# Patient Record
Sex: FEMALE | Race: BLACK OR AFRICAN AMERICAN | NOT HISPANIC OR LATINO | Employment: FULL TIME | URBAN - METROPOLITAN AREA
[De-identification: names, ages, dates, MRNs, and addresses within clinical notes are randomized per-mention and may not be internally consistent; named-entity substitution may affect disease eponyms.]

---

## 2021-03-29 ENCOUNTER — HOSPITAL ENCOUNTER (OUTPATIENT)
Dept: GENERAL RADIOLOGY | Age: 36
Discharge: HOME | End: 2021-03-29
Attending: EMERGENCY MEDICINE

## 2021-03-29 ENCOUNTER — TELEPHONE (OUTPATIENT)
Dept: SCHEDULING | Age: 36
End: 2021-03-29

## 2021-03-29 ENCOUNTER — WALK IN (OUTPATIENT)
Dept: URGENT CARE | Age: 36
End: 2021-03-29
Attending: EMERGENCY MEDICINE

## 2021-03-29 DIAGNOSIS — M54.12 CERVICAL RADICULOPATHY: ICD-10-CM

## 2021-03-29 DIAGNOSIS — M54.12 CERVICAL RADICULOPATHY: Primary | ICD-10-CM

## 2021-03-29 PROCEDURE — 72050 X-RAY EXAM NECK SPINE 4/5VWS: CPT

## 2021-03-29 PROCEDURE — 73030 X-RAY EXAM OF SHOULDER: CPT

## 2021-03-29 PROCEDURE — 99202 OFFICE O/P NEW SF 15 MIN: CPT

## 2021-03-29 RX ORDER — CYCLOBENZAPRINE HCL 5 MG
5 TABLET ORAL 3 TIMES DAILY PRN
Qty: 21 TABLET | Refills: 0 | Status: SHIPPED | OUTPATIENT
Start: 2021-03-29 | End: 2021-04-05

## 2021-03-29 RX ORDER — HYDROCODONE BITARTRATE AND ACETAMINOPHEN 5; 325 MG/1; MG/1
2 TABLET ORAL EVERY 6 HOURS PRN
Qty: 18 TABLET | Refills: 0 | Status: SHIPPED | OUTPATIENT
Start: 2021-03-29 | End: 2021-04-01

## 2021-03-29 RX ORDER — HYDROCODONE BITARTRATE AND ACETAMINOPHEN 5; 325 MG/1; MG/1
2 TABLET ORAL EVERY 6 HOURS PRN
Qty: 18 TABLET | Refills: 0 | Status: SHIPPED | OUTPATIENT
Start: 2021-03-29 | End: 2021-03-29 | Stop reason: SDUPTHER

## 2021-03-29 RX ORDER — METHYLPREDNISOLONE 4 MG/1
4 TABLET ORAL SEE ADMIN INSTRUCTIONS
Qty: 21 TABLET | Refills: 0 | Status: SHIPPED | OUTPATIENT
Start: 2021-03-29

## 2021-03-29 ASSESSMENT — PAIN SCALES - GENERAL: PAINLEVEL: 9-10

## 2021-05-26 VITALS
HEART RATE: 98 BPM | RESPIRATION RATE: 16 BRPM | DIASTOLIC BLOOD PRESSURE: 79 MMHG | SYSTOLIC BLOOD PRESSURE: 141 MMHG | OXYGEN SATURATION: 99 % | WEIGHT: 230 LBS | TEMPERATURE: 97.6 F

## 2023-04-03 ENCOUNTER — LAB ENCOUNTER (OUTPATIENT)
Dept: LAB | Age: 38
End: 2023-04-03
Attending: INTERNAL MEDICINE
Payer: COMMERCIAL

## 2023-04-03 ENCOUNTER — OFFICE VISIT (OUTPATIENT)
Dept: INTERNAL MEDICINE CLINIC | Facility: CLINIC | Age: 38
End: 2023-04-03
Payer: COMMERCIAL

## 2023-04-03 VITALS
SYSTOLIC BLOOD PRESSURE: 148 MMHG | TEMPERATURE: 100 F | OXYGEN SATURATION: 99 % | DIASTOLIC BLOOD PRESSURE: 80 MMHG | HEIGHT: 64.5 IN | WEIGHT: 231.63 LBS | HEART RATE: 90 BPM | RESPIRATION RATE: 16 BRPM | BODY MASS INDEX: 39.07 KG/M2

## 2023-04-03 DIAGNOSIS — I10 PRIMARY HYPERTENSION: ICD-10-CM

## 2023-04-03 DIAGNOSIS — F41.9 ANXIETY: ICD-10-CM

## 2023-04-03 DIAGNOSIS — Z00.00 ROUTINE PHYSICAL EXAMINATION: ICD-10-CM

## 2023-04-03 DIAGNOSIS — Z00.00 ROUTINE PHYSICAL EXAMINATION: Primary | ICD-10-CM

## 2023-04-03 LAB
ALT SERPL-CCNC: 24 U/L
ANION GAP SERPL CALC-SCNC: 2 MMOL/L (ref 0–18)
AST SERPL-CCNC: 14 U/L (ref 15–37)
BASOPHILS # BLD AUTO: 0.06 X10(3) UL (ref 0–0.2)
BASOPHILS NFR BLD AUTO: 0.5 %
BUN BLD-MCNC: 17 MG/DL (ref 7–18)
CALCIUM BLD-MCNC: 9.5 MG/DL (ref 8.5–10.1)
CHLORIDE SERPL-SCNC: 107 MMOL/L (ref 98–112)
CHOLEST SERPL-MCNC: 180 MG/DL (ref ?–200)
CO2 SERPL-SCNC: 27 MMOL/L (ref 21–32)
CREAT BLD-MCNC: 0.87 MG/DL
EOSINOPHIL # BLD AUTO: 0.14 X10(3) UL (ref 0–0.7)
EOSINOPHIL NFR BLD AUTO: 1.2 %
ERYTHROCYTE [DISTWIDTH] IN BLOOD BY AUTOMATED COUNT: 14.9 %
FASTING PATIENT LIPID ANSWER: NO
FASTING STATUS PATIENT QL REPORTED: NO
GFR SERPLBLD BASED ON 1.73 SQ M-ARVRAT: 88 ML/MIN/1.73M2 (ref 60–?)
GLUCOSE BLD-MCNC: 104 MG/DL (ref 70–99)
HCT VFR BLD AUTO: 40.1 %
HDLC SERPL-MCNC: 52 MG/DL (ref 40–59)
HGB BLD-MCNC: 12.9 G/DL
IMM GRANULOCYTES # BLD AUTO: 0.02 X10(3) UL (ref 0–1)
IMM GRANULOCYTES NFR BLD: 0.2 %
LDLC SERPL CALC-MCNC: 109 MG/DL (ref ?–100)
LYMPHOCYTES # BLD AUTO: 4.28 X10(3) UL (ref 1–4)
LYMPHOCYTES NFR BLD AUTO: 37.2 %
MCH RBC QN AUTO: 25.2 PG (ref 26–34)
MCHC RBC AUTO-ENTMCNC: 32.2 G/DL (ref 31–37)
MCV RBC AUTO: 78.3 FL
MONOCYTES # BLD AUTO: 1.07 X10(3) UL (ref 0.1–1)
MONOCYTES NFR BLD AUTO: 9.3 %
NEUTROPHILS # BLD AUTO: 5.93 X10 (3) UL (ref 1.5–7.7)
NEUTROPHILS # BLD AUTO: 5.93 X10(3) UL (ref 1.5–7.7)
NEUTROPHILS NFR BLD AUTO: 51.6 %
NONHDLC SERPL-MCNC: 128 MG/DL (ref ?–130)
OSMOLALITY SERPL CALC.SUM OF ELEC: 284 MOSM/KG (ref 275–295)
PLATELET # BLD AUTO: 386 10(3)UL (ref 150–450)
POTASSIUM SERPL-SCNC: 4 MMOL/L (ref 3.5–5.1)
RBC # BLD AUTO: 5.12 X10(6)UL
SODIUM SERPL-SCNC: 136 MMOL/L (ref 136–145)
TRIGL SERPL-MCNC: 106 MG/DL (ref 30–149)
TSI SER-ACNC: 2.5 MIU/ML (ref 0.36–3.74)
VLDLC SERPL CALC-MCNC: 18 MG/DL (ref 0–30)
WBC # BLD AUTO: 11.5 X10(3) UL (ref 4–11)

## 2023-04-03 PROCEDURE — 80061 LIPID PANEL: CPT

## 2023-04-03 PROCEDURE — 84443 ASSAY THYROID STIM HORMONE: CPT

## 2023-04-03 PROCEDURE — 80048 BASIC METABOLIC PNL TOTAL CA: CPT

## 2023-04-03 PROCEDURE — 84460 ALANINE AMINO (ALT) (SGPT): CPT

## 2023-04-03 PROCEDURE — 36415 COLL VENOUS BLD VENIPUNCTURE: CPT

## 2023-04-03 PROCEDURE — 84450 TRANSFERASE (AST) (SGOT): CPT

## 2023-04-03 PROCEDURE — 85025 COMPLETE CBC W/AUTO DIFF WBC: CPT

## 2023-04-03 RX ORDER — RISPERIDONE 1 MG/1
1 TABLET ORAL NIGHTLY
COMMUNITY
Start: 2023-02-06

## 2023-04-03 RX ORDER — METRONIDAZOLE 500 MG/1
500 TABLET ORAL 2 TIMES DAILY
COMMUNITY
Start: 2023-03-27

## 2023-04-03 RX ORDER — GABAPENTIN 100 MG/1
100 CAPSULE ORAL 2 TIMES DAILY PRN
COMMUNITY
Start: 2023-02-06

## 2023-04-03 RX ORDER — AMLODIPINE BESYLATE 5 MG/1
5 TABLET ORAL DAILY
Qty: 90 TABLET | Refills: 0 | Status: SHIPPED | OUTPATIENT
Start: 2023-04-03 | End: 2024-03-28

## 2023-04-03 NOTE — PATIENT INSTRUCTIONS
Continue to exercise at least 150 minutes a week and Eat a plant based diet     Please take 2000 IU of vitamin D daily for life to keep your bones strong    I have ordered blood tests for you.  No need to fast    Please start amlodipine at night time for your blood pressure    I have referred you to our therapist, she will give you a call    You received the Tdap vaccine (tetanus vaccine) today    See me back in 1 month for a blood pressure check

## 2023-04-04 NOTE — PROGRESS NOTES
Dear RN, please let the patient know   1. Bad cholesterol called LDL is slightly high. Continue to exercise at least 150 minutes a week and eat a high fiber diet  2. Liver and kidney function is normal  3. White blood cell count is very mildly elevated. We will continue to monitor. This could be her normal or could be due to a recent infection  4.  Thyroid function is normal  Please let me know if you have any questions  Alyssa Morales DO

## 2023-04-05 ENCOUNTER — TELEPHONE (OUTPATIENT)
Dept: INTERNAL MEDICINE CLINIC | Facility: CLINIC | Age: 38
End: 2023-04-05

## 2023-05-08 ENCOUNTER — OFFICE VISIT (OUTPATIENT)
Dept: INTERNAL MEDICINE CLINIC | Facility: CLINIC | Age: 38
End: 2023-05-08
Payer: COMMERCIAL

## 2023-05-08 VITALS
BODY MASS INDEX: 40.58 KG/M2 | OXYGEN SATURATION: 98 % | TEMPERATURE: 98 F | HEIGHT: 64.5 IN | SYSTOLIC BLOOD PRESSURE: 130 MMHG | RESPIRATION RATE: 18 BRPM | DIASTOLIC BLOOD PRESSURE: 82 MMHG | HEART RATE: 111 BPM | WEIGHT: 240.63 LBS

## 2023-05-08 DIAGNOSIS — F41.1 GAD (GENERALIZED ANXIETY DISORDER): ICD-10-CM

## 2023-05-08 DIAGNOSIS — I10 PRIMARY HYPERTENSION: Primary | ICD-10-CM

## 2023-05-08 PROBLEM — F41.9 ANXIETY: Status: RESOLVED | Noted: 2023-04-03 | Resolved: 2023-05-08

## 2023-05-08 PROBLEM — F34.81 DISRUPTIVE MOOD DYSREGULATION DISORDER (HCC): Status: ACTIVE | Noted: 2023-05-08

## 2023-05-08 PROCEDURE — 3075F SYST BP GE 130 - 139MM HG: CPT | Performed by: INTERNAL MEDICINE

## 2023-05-08 PROCEDURE — 3079F DIAST BP 80-89 MM HG: CPT | Performed by: INTERNAL MEDICINE

## 2023-05-08 PROCEDURE — 3008F BODY MASS INDEX DOCD: CPT | Performed by: INTERNAL MEDICINE

## 2023-05-08 PROCEDURE — 99213 OFFICE O/P EST LOW 20 MIN: CPT | Performed by: INTERNAL MEDICINE

## 2023-05-08 RX ORDER — AMLODIPINE BESYLATE 5 MG/1
5 TABLET ORAL DAILY
Qty: 90 TABLET | Refills: 1 | Status: SHIPPED | OUTPATIENT
Start: 2023-05-08 | End: 2024-05-02

## 2023-05-08 RX ORDER — ALPRAZOLAM 0.5 MG/1
0.5 TABLET ORAL DAILY PRN
COMMUNITY
Start: 2023-04-29

## 2023-05-08 RX ORDER — RISPERIDONE 2 MG/1
2 TABLET ORAL NIGHTLY
COMMUNITY
Start: 2023-04-29

## 2024-04-09 ENCOUNTER — TELEPHONE (OUTPATIENT)
Dept: INTERNAL MEDICINE CLINIC | Facility: CLINIC | Age: 39
End: 2024-04-09

## 2024-04-09 DIAGNOSIS — I10 PRIMARY HYPERTENSION: ICD-10-CM

## 2024-04-09 NOTE — TELEPHONE ENCOUNTER
Patient called the office to report that she had a high blood pressure at work today during a physical with her employer. She states that at 2:00 today that her bp was reading at 149/90. Patient also states that she was advised by her employer to make an appointment to get some medication to help with this, but next available is on 4/30. Please call back at [phone] and advise.   Future Appointments   Date Time Provider Department Center   4/30/2024 12:30 PM Alyssa Solis,  [facility] [place]

## 2024-04-09 NOTE — TELEPHONE ENCOUNTER
S/w patient. C/o having elevated BP today at work. Reports it was 149/90. Patient checked BP again today at 4:30 pm and reports result of 149/90 again. She is using automated upper arm cuff. Did not check HR. She stopped taking Amlodipine when she ran out in December. She has been noticing daily headaches localized to frontal area, reports pain is 8/10 and will occasionally take OTC Tylenol but other wise \"mostly deals with it\". She noticed they started in February. Also about 2 weeks ago has been having intermittent episodes of noticing \"zig zags\" in visual field that last a few minutes. Denies CP, lightheadedness/dizziness, SOB, N/V, numbness/weakness, palpitations, swelling.     She wears a respirator at work and they are worried about her BP.     Patient has follow up appt on 4/30, please advise would you like patient to be seen sooner or restart BP medication in mean time?     Explained to patient to check BP tonight, tomorrow and maintain log (explained proper technique).     Patient aware should proceed to ED with increasing BP >180/120, worsening/new symptoms.

## 2024-04-10 RX ORDER — AMLODIPINE BESYLATE 5 MG/1
5 TABLET ORAL DAILY
Qty: 30 TABLET | Refills: 0 | Status: SHIPPED | OUTPATIENT
Start: 2024-04-10 | End: 2025-04-05

## 2024-04-10 NOTE — TELEPHONE ENCOUNTER
I have refilled the medication for one month. Add her to the cancellation list  Alyssa Solis DO

## 2024-04-30 ENCOUNTER — OFFICE VISIT (OUTPATIENT)
Dept: INTERNAL MEDICINE CLINIC | Facility: CLINIC | Age: 39
End: 2024-04-30
Payer: COMMERCIAL

## 2024-04-30 ENCOUNTER — LAB ENCOUNTER (OUTPATIENT)
Dept: LAB | Age: 39
End: 2024-04-30
Attending: INTERNAL MEDICINE
Payer: COMMERCIAL

## 2024-04-30 VITALS
SYSTOLIC BLOOD PRESSURE: 142 MMHG | TEMPERATURE: 98 F | OXYGEN SATURATION: 99 % | DIASTOLIC BLOOD PRESSURE: 82 MMHG | HEIGHT: 64.5 IN | WEIGHT: 234.63 LBS | BODY MASS INDEX: 39.57 KG/M2 | RESPIRATION RATE: 16 BRPM | HEART RATE: 85 BPM

## 2024-04-30 DIAGNOSIS — I10 PRIMARY HYPERTENSION: ICD-10-CM

## 2024-04-30 DIAGNOSIS — E66.01 CLASS 2 SEVERE OBESITY WITH SERIOUS COMORBIDITY AND BODY MASS INDEX (BMI) OF 39.0 TO 39.9 IN ADULT, UNSPECIFIED OBESITY TYPE (HCC): ICD-10-CM

## 2024-04-30 DIAGNOSIS — Z00.00 ROUTINE PHYSICAL EXAMINATION: Primary | ICD-10-CM

## 2024-04-30 DIAGNOSIS — R07.89 ATYPICAL CHEST PAIN: ICD-10-CM

## 2024-04-30 DIAGNOSIS — F41.1 GAD (GENERALIZED ANXIETY DISORDER): ICD-10-CM

## 2024-04-30 DIAGNOSIS — H53.8 BLURRY VISION: ICD-10-CM

## 2024-04-30 DIAGNOSIS — F34.81 DISRUPTIVE MOOD DYSREGULATION DISORDER (HCC): ICD-10-CM

## 2024-04-30 DIAGNOSIS — Z00.00 ROUTINE PHYSICAL EXAMINATION: ICD-10-CM

## 2024-04-30 PROBLEM — E66.812 CLASS 2 SEVERE OBESITY WITH SERIOUS COMORBIDITY AND BODY MASS INDEX (BMI) OF 39.0 TO 39.9 IN ADULT (HCC): Status: ACTIVE | Noted: 2024-04-30

## 2024-04-30 LAB
ALT SERPL-CCNC: 16 U/L
ANION GAP SERPL CALC-SCNC: 8 MMOL/L (ref 0–18)
AST SERPL-CCNC: 21 U/L (ref ?–34)
ATRIAL RATE: 86 BPM
BASOPHILS # BLD AUTO: 0.04 X10(3) UL (ref 0–0.2)
BASOPHILS NFR BLD AUTO: 0.4 %
BUN BLD-MCNC: 11 MG/DL (ref 9–23)
BUN/CREAT SERPL: 12 (ref 10–20)
CALCIUM BLD-MCNC: 9.6 MG/DL (ref 8.7–10.4)
CHLORIDE SERPL-SCNC: 106 MMOL/L (ref 98–112)
CHOLEST SERPL-MCNC: 207 MG/DL (ref ?–200)
CO2 SERPL-SCNC: 25 MMOL/L (ref 21–32)
CREAT BLD-MCNC: 0.92 MG/DL
DEPRECATED RDW RBC AUTO: 43.6 FL (ref 35.1–46.3)
EGFRCR SERPLBLD CKD-EPI 2021: 82 ML/MIN/1.73M2 (ref 60–?)
EOSINOPHIL # BLD AUTO: 0.11 X10(3) UL (ref 0–0.7)
EOSINOPHIL NFR BLD AUTO: 1.2 %
ERYTHROCYTE [DISTWIDTH] IN BLOOD BY AUTOMATED COUNT: 15.3 % (ref 11–15)
EST. AVERAGE GLUCOSE BLD GHB EST-MCNC: 114 MG/DL (ref 68–126)
FASTING PATIENT LIPID ANSWER: NO
FASTING STATUS PATIENT QL REPORTED: NO
GLUCOSE BLD-MCNC: 90 MG/DL (ref 70–99)
HBA1C MFR BLD: 5.6 % (ref ?–5.7)
HCT VFR BLD AUTO: 39.5 %
HDLC SERPL-MCNC: 49 MG/DL (ref 40–59)
HGB BLD-MCNC: 12.4 G/DL
IMM GRANULOCYTES # BLD AUTO: 0.02 X10(3) UL (ref 0–1)
IMM GRANULOCYTES NFR BLD: 0.2 %
LDLC SERPL CALC-MCNC: 141 MG/DL (ref ?–100)
LYMPHOCYTES # BLD AUTO: 3.82 X10(3) UL (ref 1–4)
LYMPHOCYTES NFR BLD AUTO: 40.1 %
MCH RBC QN AUTO: 24.7 PG (ref 26–34)
MCHC RBC AUTO-ENTMCNC: 31.4 G/DL (ref 31–37)
MCV RBC AUTO: 78.5 FL
MONOCYTES # BLD AUTO: 0.75 X10(3) UL (ref 0.1–1)
MONOCYTES NFR BLD AUTO: 7.9 %
NEUTROPHILS # BLD AUTO: 4.78 X10 (3) UL (ref 1.5–7.7)
NEUTROPHILS # BLD AUTO: 4.78 X10(3) UL (ref 1.5–7.7)
NEUTROPHILS NFR BLD AUTO: 50.2 %
NONHDLC SERPL-MCNC: 158 MG/DL (ref ?–130)
OSMOLALITY SERPL CALC.SUM OF ELEC: 287 MOSM/KG (ref 275–295)
P AXIS: 57 DEGREES
P-R INTERVAL: 158 MS
PLATELET # BLD AUTO: 365 10(3)UL (ref 150–450)
POTASSIUM SERPL-SCNC: 4.2 MMOL/L (ref 3.5–5.1)
Q-T INTERVAL: 368 MS
QRS DURATION: 86 MS
QTC CALCULATION (BEZET): 440 MS
R AXIS: 30 DEGREES
RBC # BLD AUTO: 5.03 X10(6)UL
SODIUM SERPL-SCNC: 139 MMOL/L (ref 136–145)
T AXIS: 8 DEGREES
TRIGL SERPL-MCNC: 92 MG/DL (ref 30–149)
TSI SER-ACNC: 1.48 MIU/ML (ref 0.55–4.78)
VENTRICULAR RATE: 86 BPM
VLDLC SERPL CALC-MCNC: 17 MG/DL (ref 0–30)
WBC # BLD AUTO: 9.5 X10(3) UL (ref 4–11)

## 2024-04-30 PROCEDURE — 80061 LIPID PANEL: CPT

## 2024-04-30 PROCEDURE — 84443 ASSAY THYROID STIM HORMONE: CPT

## 2024-04-30 PROCEDURE — 80048 BASIC METABOLIC PNL TOTAL CA: CPT

## 2024-04-30 PROCEDURE — 36415 COLL VENOUS BLD VENIPUNCTURE: CPT

## 2024-04-30 PROCEDURE — 84460 ALANINE AMINO (ALT) (SGPT): CPT

## 2024-04-30 PROCEDURE — 84450 TRANSFERASE (AST) (SGOT): CPT

## 2024-04-30 PROCEDURE — 85025 COMPLETE CBC W/AUTO DIFF WBC: CPT

## 2024-04-30 PROCEDURE — 83036 HEMOGLOBIN GLYCOSYLATED A1C: CPT

## 2024-04-30 PROCEDURE — 99213 OFFICE O/P EST LOW 20 MIN: CPT | Performed by: INTERNAL MEDICINE

## 2024-04-30 PROCEDURE — 93000 ELECTROCARDIOGRAM COMPLETE: CPT | Performed by: INTERNAL MEDICINE

## 2024-04-30 PROCEDURE — 99395 PREV VISIT EST AGE 18-39: CPT | Performed by: INTERNAL MEDICINE

## 2024-04-30 RX ORDER — AMLODIPINE BESYLATE 10 MG/1
10 TABLET ORAL DAILY
Qty: 90 TABLET | Refills: 0 | Status: SHIPPED | OUTPATIENT
Start: 2024-04-30

## 2024-04-30 NOTE — PROGRESS NOTES
Patient Office Visit    ASSESSMENT AND PLAN:   1. Routine physical examination  Note: Please take 2000 IU of vitamin D daily for life to keep your bones strong. Please see your dentist every 6 months.  Continue with regular eye exams  - ALT(SGPT); Future  - AST (SGOT); Future  - Basic Metabolic Panel (8); Future  - Lipid Panel; Future  - CBC With Differential With Platelet; Future  - TSH W Reflex To Free T4; Future  - Hemoglobin A1C; Future    2. Atypical chest pain  Note: EKG with normal sinus rhythm and no new ST or T wave changes.  Patient denies any acid reflux-like symptoms.  She denies any symptoms currently.  Recommended patient to get an echocardiogram and if symptoms worsen in any way or returns then she should go to the ER immediately.  - EKG with interpretation and Report -IN OFFICE [58922]  - CARD ECHO 2D DOPPLER (CPT=93306); Future    3. Primary hypertension  Note: Will increase amlodipine to 10 mg.   - amLODIPine 10 MG Oral Tab; Take 1 tablet (10 mg total) by mouth daily.  Dispense: 90 tablet; Refill: 0    4. Class 2 severe obesity with serious comorbidity and body mass index (BMI) of 39.0 to 39.9 in adult, unspecified obesity type (HCC)  Note: Continue to work on diet and lifestyle modifications    5. Disruptive mood dysregulation disorder (HCC)  Note: Follows with psychiatry and will continue with sertraline    6. PIPER (generalized anxiety disorder)  Note: As above    7. Blurry vision  Note: Referral provided  - Ophthalmology Referral - External    Discussed to go to the ER if her symptoms worsen in any way otherwise she will follow-up in 3 to 4 weeks for blood pressure check      Patient/Caregiver Education: Patient/Caregiver Education: There are no barriers to learning. Medical education done. Outcome: Patient verbalizes understanding. Patient is notified to call with any questions, complications, allergies, or worsening or changing symptoms.  Patient is to call with any side effects or  complications from the treatments as a result of today.      Reviewed Past Medical History and   Patient Active Problem List   Diagnosis    PIPER (generalized anxiety disorder)    Disruptive mood dysregulation disorder (HCC)    Class 2 severe obesity with serious comorbidity and body mass index (BMI) of 39.0 to 39.9 in adult (HCC)    Primary hypertension       Orders Placed This Encounter   Procedures    ALT(SGPT)     Standing Status:   Future     Standing Expiration Date:   4/30/2025    AST (SGOT)     Standing Status:   Future     Standing Expiration Date:   4/30/2025    Basic Metabolic Panel (8)     Standing Status:   Future     Standing Expiration Date:   4/30/2025    Lipid Panel     Standing Status:   Future     Standing Expiration Date:   4/30/2025    CBC With Differential With Platelet     Standing Status:   Future     Standing Expiration Date:   4/30/2025    TSH W Reflex To Free T4     Standing Status:   Future     Standing Expiration Date:   4/30/2025    Hemoglobin A1C     Standing Status:   Future     Standing Expiration Date:   4/30/2025     Requested Prescriptions     Signed Prescriptions Disp Refills    amLODIPine 10 MG Oral Tab 90 tablet 0     Sig: Take 1 tablet (10 mg total) by mouth daily.         Alyssa Solis DO  CC:  Chief Complaint   Patient presents with    Follow - Up     Blood pressure         HPI:   Tameka Latif is a 39-year-old female who presents for a routine physical and to discuss the following concerns    Elevated blood pressure: She wears a respirator for work and while she was working she started to feel lightheaded.  They did a physical on her and her blood pressure was elevated.  They also did an EKG which per the patient was normal but she was not sure.  Since her blood pressure was elevated she was asked to follow-up here.  She started the amlodipine but has not been really checking her blood pressure and she is not even sure if her symptoms have improved.  Left-sided  chest pain: Has been going on for months and it can occur at any time.  She denies any shortness of breath with it.  The pain can last for few minutes to few hours.  She has not noticed any improvement with resting or worsening with exertion.  She denies any acid reflux-like symptoms as well.  She denies any recent illnesses and no recent falls.  She denies any history of DVT/PE.  She does not take any drugs such as heroin or cocaine.  She is not sure if the pain gets worse with palpation.  Blurry vision: Has been going on for months.  She has seen her regular eye doctor and was told nothing was wrong but she would like to see a specialist as well  Mood disorder: Follows with psychiatry and is taking sertraline    No past medical history on file.    No past surgical history on file.    Social History:  Social History     Socioeconomic History    Marital status: Single   Tobacco Use    Smoking status: Never    Smokeless tobacco: Never   Vaping Use    Vaping status: Never Used   Substance and Sexual Activity    Alcohol use: Not Currently    Drug use: Never   Other Topics Concern    Caffeine Concern Yes     Comment: 1 cup of coffee daily    Exercise No     Family History:  Family History   Problem Relation Age of Onset    Hypertension Mother     Hypertension Brother     Hypertension Maternal Grandmother     Diabetes Maternal Grandmother      Allergies:  No Known Allergies  Current Meds:  Current Outpatient Medications on File Prior to Visit   Medication Sig Dispense Refill    sertraline 50 MG Oral Tab Take 1 tablet (50 mg total) by mouth daily.       No current facility-administered medications on file prior to visit.         REVIEW OF SYSTEMS   Constitutional: no fatigue normal energy no weight changes   HENT: normal sinuses and no mouth issues   Eyes: . normal vision no eye pain   Respiratory: normal respirations no cough   Cardiovascular: as above   Gastrointestinal: normal bowels and no abd pains   Genitourinary:   normal urination no hematuria, no frequency   Musculoskeletal: no pains in arms/legs, normal range of motion   Skin: no rashes or skin lesions that are new   Neurological:  no weakness, no numbness, normal gait   Hematological:  no bruises or bleeding   Psychiatric/Behavioral: normal mood no anxiety normal behavior     /82   Pulse 85   Temp 98.2 °F (36.8 °C) (Temporal)   Resp 16   Ht 5' 4.5\" (1.638 m)   Wt 234 lb 9.6 oz (106.4 kg)   LMP 04/11/2023 (Approximate)   SpO2 99%   BMI 39.65 kg/m²     PHYSICAL EXAM:   Constitutional: Vital signs reviewed as noted, well developed, in no acute distress.   HENT: NCAT, bilateral ear canal and tympanic membrane appear normal  Eyes: pupils reactive bilaterally  Neck: No thyroidmegaly  Cardiovascular: nl s1 s2 no m/r/g  Pulmonary/Chest: CTA bilaterally with no wheezes  Abdominal: Soft NT normal Bowel sounds  Extremities: no pedal edema   Neurological:  no weakness in UE and LE, reflexes are normal  Skin: no rashes or bruises on visualized skin, not undressed   Psychiatric:normal mood

## 2024-04-30 NOTE — PATIENT INSTRUCTIONS
I have ordered blood work for you    If your chest pain worsens in any way please go to the ER immediately. Your EKG looks normal. I highly recommend an ultrasound of the heart.     Please increase your blood pressure medication to amlodipine 10 mg which I have ordered    Please follow up in 3-4 weeks for a blood pressure check, but if symptoms worsen in any way then go to the ER

## 2024-05-01 NOTE — PROGRESS NOTES
Dear RN, please let the patient know   1.  Bad cholesterol called LDL is elevated.  Continue exercise daily and avoid red meat/fried food.  Adding more fiber to the diet can help as well  2.  Blood count is normal  3.  She is not diabetic  4.  Liver tests are normal  5.  Kidney function and thyroid function are normal    She should follow-up with the eye doctor in regards to her blurry vision and continue her blood pressure medicine  Alyssa Solis DO

## 2024-05-30 ENCOUNTER — OFFICE VISIT (OUTPATIENT)
Dept: INTERNAL MEDICINE CLINIC | Facility: CLINIC | Age: 39
End: 2024-05-30

## 2024-05-30 VITALS
HEIGHT: 64.5 IN | WEIGHT: 231 LBS | RESPIRATION RATE: 16 BRPM | SYSTOLIC BLOOD PRESSURE: 136 MMHG | HEART RATE: 98 BPM | BODY MASS INDEX: 38.96 KG/M2 | TEMPERATURE: 98 F | OXYGEN SATURATION: 99 % | DIASTOLIC BLOOD PRESSURE: 82 MMHG

## 2024-05-30 DIAGNOSIS — I10 PRIMARY HYPERTENSION: Primary | ICD-10-CM

## 2024-05-30 DIAGNOSIS — H53.8 BLURRY VISION: ICD-10-CM

## 2024-05-30 DIAGNOSIS — F34.81 DISRUPTIVE MOOD DYSREGULATION DISORDER (HCC): ICD-10-CM

## 2024-05-30 DIAGNOSIS — E66.01 CLASS 2 SEVERE OBESITY WITH SERIOUS COMORBIDITY AND BODY MASS INDEX (BMI) OF 39.0 TO 39.9 IN ADULT, UNSPECIFIED OBESITY TYPE (HCC): ICD-10-CM

## 2024-05-30 DIAGNOSIS — E78.2 MIXED HYPERLIPIDEMIA: ICD-10-CM

## 2024-05-30 PROCEDURE — 99214 OFFICE O/P EST MOD 30 MIN: CPT | Performed by: INTERNAL MEDICINE

## 2024-05-30 RX ORDER — AMLODIPINE BESYLATE 10 MG/1
10 TABLET ORAL DAILY
Qty: 90 TABLET | Refills: 1 | Status: SHIPPED | OUTPATIENT
Start: 2024-05-30

## 2024-05-30 RX ORDER — SERTRALINE HYDROCHLORIDE 100 MG/1
100 TABLET, FILM COATED ORAL DAILY
COMMUNITY
Start: 2024-05-20

## 2024-05-30 NOTE — PROGRESS NOTES
Patient Office Visit    ASSESSMENT AND PLAN:   1. Primary hypertension  Note: will continue with amlodipine. Chest pain has improved and patient has declined to have a stress test/echo cardiogram done. Discussed the red flags that will prompt an ER visit      2. Blurry vision  Note: new referral provided. If she does not hear back from them will assist patient in scheduling an appointment   - Ophthalmology Referral - External    3. Mixed hyperlipidemia  Note: diet controlled     4. Disruptive mood dysregulation disorder (HCC)  Note: requested patient to check what dose of sertraline she is taking     5. Class 2 severe obesity with serious comorbidity and body mass index (BMI) of 39.0 to 39.9 in adult, unspecified obesity type (HCC)  Note: Continue to exercise at least 150 minutes a week and Eat a plant based diet        Declines pap today  RTC in 6 months       Patient/Caregiver Education: Patient/Caregiver Education: There are no barriers to learning. Medical education done. Outcome: Patient verbalizes understanding. Patient is notified to call with any questions, complications, allergies, or worsening or changing symptoms.  Patient is to call with any side effects or complications from the treatments as a result of today.      Reviewed Past Medical History and   Patient Active Problem List   Diagnosis    PIPER (generalized anxiety disorder)    Disruptive mood dysregulation disorder (HCC)    Class 2 severe obesity with serious comorbidity and body mass index (BMI) of 39.0 to 39.9 in adult (HCC)    Primary hypertension    Mixed hyperlipidemia       No orders of the defined types were placed in this encounter.    Requested Prescriptions      No prescriptions requested or ordered in this encounter         Alyssa Solis DO  CC:  Chief Complaint   Patient presents with    Follow - Up         HPI:   Tameka Latif is a 39 year old female who presents for a follow up    Hypertension: has improved since increasing  her blood pressure medication. Her chest pain has improved since blood pressure has improved and since her anxiety/stress has improved at work  Obesity: she is working on diet and lifestyle modification  PIPER: she is not sure what sertraline dose she is taking, but her dose was increased recently   Blurry vision: still present and was never able to get a call back from the eye doctor's office     History reviewed. No pertinent past medical history.    History reviewed. No pertinent surgical history.    Social History:  Social History     Socioeconomic History    Marital status: Single   Tobacco Use    Smoking status: Never    Smokeless tobacco: Never   Vaping Use    Vaping status: Never Used   Substance and Sexual Activity    Alcohol use: Not Currently    Drug use: Never   Other Topics Concern    Caffeine Concern Yes     Comment: 1 cup of coffee daily    Exercise No     Family History:  Family History   Problem Relation Age of Onset    Hypertension Mother     Hypertension Brother     Hypertension Maternal Grandmother     Diabetes Maternal Grandmother      Allergies:  No Known Allergies  Current Meds:  Current Outpatient Medications on File Prior to Visit   Medication Sig Dispense Refill    sertraline 100 MG Oral Tab Take 1 tablet (100 mg total) by mouth daily.      sertraline 50 MG Oral Tab Take 1 tablet (50 mg total) by mouth daily.      amLODIPine 10 MG Oral Tab Take 1 tablet (10 mg total) by mouth daily. 90 tablet 0     No current facility-administered medications on file prior to visit.         REVIEW OF SYSTEMS   Constitutional: no fatigue normal energy no weight changes   HENT: normal sinuses and no mouth issues   Eyes: . normal vision no eye pain   Respiratory: normal respirations no cough   Cardiovascular: no CP, or palpitations   Gastrointestinal: normal bowels and no abd pains   Genitourinary:  normal urination no hematuria, no frequency   Musculoskeletal: no pains in arms/legs, normal range of motion    Skin: no rashes or skin lesions that are new   Neurological:  no weakness, no numbness, normal gait   Hematological:  no bruises or bleeding   Psychiatric/Behavioral: normal mood no anxiety normal behavior     /82 (BP Location: Right arm, Patient Position: Sitting, Cuff Size: adult)   Pulse 98   Temp 98 °F (36.7 °C) (Temporal)   Resp 16   Ht 5' 4.5\" (1.638 m)   Wt 231 lb (104.8 kg)   SpO2 99%   BMI 39.04 kg/m²     PHYSICAL EXAM:   Constitutional: Vital signs reviewed as noted, well developed, in no acute distress.   HENT: NCAT  Eyes: pupils reactive bilaterally  Cardiovascular: nl s1 s2 no m/r/g  Pulmonary/Chest: CTA bilaterally with no wheezes  Extremities: no pedal edema   Neurological:  no weakness in UE and LE, reflexes are normal  Skin: no rashes or bruises on visualized skin, not undressed   Psychiatric:normal mood

## 2024-05-30 NOTE — PATIENT INSTRUCTIONS
Please call to schedule an appointment with the new eye doctor. If you do not hear back then let us know and we will assist in scheduling an appointment    Continue your medications    Let us know what dose of sertraline you are taking    If chest pain returns or worsens then go to the ER     See me back in 6 months

## 2024-06-18 ENCOUNTER — HOSPITAL ENCOUNTER (OUTPATIENT)
Age: 39
Discharge: HOME | End: 2024-06-18
Attending: EMERGENCY MEDICINE

## 2024-06-18 VITALS
OXYGEN SATURATION: 99 % | HEART RATE: 96 BPM | WEIGHT: 235 LBS | DIASTOLIC BLOOD PRESSURE: 61 MMHG | RESPIRATION RATE: 20 BRPM | BODY MASS INDEX: 37.77 KG/M2 | TEMPERATURE: 97 F | HEIGHT: 66 IN | SYSTOLIC BLOOD PRESSURE: 133 MMHG

## 2024-06-18 DIAGNOSIS — J06.9 VIRAL URI WITH COUGH: Primary | ICD-10-CM

## 2024-06-18 LAB
POCT INFLUENZA A: NEGATIVE
POCT INFLUENZA B: NEGATIVE
S PYO AG THROAT QL IA.RAPID: NEGATIVE
SARS-COV-2 RNA RESP QL NAA+PROBE: NOT DETECTED

## 2024-06-18 PROCEDURE — 87502 INFLUENZA DNA AMP PROBE: CPT | Performed by: EMERGENCY MEDICINE

## 2024-06-18 PROCEDURE — 99203 OFFICE O/P NEW LOW 30 MIN: CPT

## 2024-06-18 PROCEDURE — 87651 STREP A DNA AMP PROBE: CPT | Performed by: EMERGENCY MEDICINE

## 2024-06-18 PROCEDURE — 99213 OFFICE O/P EST LOW 20 MIN: CPT

## 2024-06-18 RX ORDER — FLUTICASONE PROPIONATE 50 MCG
2 SPRAY, SUSPENSION (ML) NASAL DAILY
Qty: 16 G | Refills: 0 | Status: SHIPPED | OUTPATIENT
Start: 2024-06-18 | End: 2024-07-18

## 2024-06-18 RX ORDER — LORATADINE 10 MG
1 CAPSULE ORAL 3 TIMES DAILY PRN
Qty: 168 CAPSULE | Refills: 0 | Status: SHIPPED | OUTPATIENT
Start: 2024-06-18 | End: 2024-07-02

## 2024-06-18 RX ORDER — ALBUTEROL SULFATE 90 UG/1
2 AEROSOL, METERED RESPIRATORY (INHALATION) EVERY 4 HOURS PRN
Qty: 1 EACH | Refills: 0 | Status: SHIPPED | OUTPATIENT
Start: 2024-06-18 | End: 2024-07-18

## 2024-06-18 NOTE — ED PROVIDER NOTES
Patient Seen in: [facility]      History     Chief Complaint   Patient presents with    Sore Throat     Stated Complaint: difficulty breathing; coughing; headache    Subjective:   HPI    Cough congestion sore throat headache   Niece is ill with similar symptoms at the age of 9 years old.  He is very congested wears a respirator as part of her job and able to unable to do so secondary to significant congestion.      Objective:   Past Medical History:    Anxiety    Depression    Essential hypertension              History reviewed. No pertinent surgical history.             Social History     Socioeconomic History    Marital status: Single   Tobacco Use    Smoking status: Never    Smokeless tobacco: Never   Vaping Use    Vaping status: Never Used   Substance and Sexual Activity    Alcohol use: Not Currently    Drug use: Never   Other Topics Concern    Caffeine Concern Yes     Comment: 1 cup of coffee daily    Exercise No              Review of Systems    Positive for stated complaint: difficulty breathing; coughing; headache  Other systems are as noted in HPI.  Constitutional and vital signs reviewed.      All other systems reviewed and negative except as noted above.    Physical Exam     ED Triage Vitals [06/18/24 1151]   /61   Pulse 96   Resp 20   Temp 96.9 °F (36.1 °C)   Temp src Temporal   SpO2 99 %   O2 Device None (Room air)       Current Vitals:   Vital Signs  BP: 133/61  Pulse: 96  Resp: 20  Temp: 96.9 °F (36.1 °C)  Temp src: Temporal    Oxygen Therapy  SpO2: 99 %  O2 Device: None (Room air)            Physical Exam    No acute distress, nontoxic, mucocele on left tonsil   Congested cough wth clear lung sounds  No severe TM bulging some post-nasal drip  Lungs CTA bilaterally   Abdomen soft and nontender    ED Course     Labs Reviewed   RAPID SARS-COV-2 BY PCR - Normal   POCT FLU TEST - Normal    Narrative:     This assay is a rapid molecular in vitro test utilizing nucleic acid  amplification of influenza A and B viral RNA.   RAPID STREP A - Normal   RAPID STREP A SCREEN (LC)                      MDM      39-year-old presents to the urgent care with concerns for cough congestion and headache.  Symptoms going on for 4 days.  Given ongoing symptoms comes to the urgent care for evaluation differential diagnosis includes COVID flu viral URI strep.  Testing is negative for strep flu and COVID.  Do suspect viral URI discussed with patient will treat symptomatically.  Stable for discharge home                                   Medical Decision Making      Disposition and Plan     Clinical Impression:  1. Viral URI with cough         Disposition:  Discharge  6/18/2024 12:42 pm    Follow-up:  No follow-up provider specified.        Medications Prescribed:  Current Discharge Medication List        START taking these medications    Details   albuterol 108 (90 Base) MCG/ACT Inhalation Aero Soln Inhale 2 puffs into the lungs every 4 (four) hours as needed for Wheezing.  Qty: 1 each, Refills: 0      fluticasone propionate 50 MCG/ACT Nasal Suspension 2 sprays by Nasal route daily.  Qty: 16 g, Refills: 0      Dextromethorphan-guaiFENesin (CORICIDIN HBP CONGESTION/COUGH)  MG Oral Cap Take 1 capsule by mouth 3 (three) times daily as needed (cough and congestion).  Qty: 168 capsule, Refills: 0

## 2024-06-18 NOTE — DISCHARGE INSTRUCTIONS
Drink lots of fluids, rest, gargle with salt water for sore throat, take Coricidin HBP for cold symptoms.  Flonase can help with sore throat and congestion and albuterol if cough continues

## 2024-06-24 ENCOUNTER — OFFICE VISIT (OUTPATIENT)
Dept: INTERNAL MEDICINE CLINIC | Facility: CLINIC | Age: 39
End: 2024-06-24

## 2024-06-24 VITALS
SYSTOLIC BLOOD PRESSURE: 116 MMHG | RESPIRATION RATE: 20 BRPM | TEMPERATURE: 98 F | OXYGEN SATURATION: 97 % | HEART RATE: 116 BPM | DIASTOLIC BLOOD PRESSURE: 74 MMHG | WEIGHT: 227.38 LBS | HEIGHT: 66 IN | BODY MASS INDEX: 36.54 KG/M2

## 2024-06-24 DIAGNOSIS — J32.9 SINOBRONCHITIS: ICD-10-CM

## 2024-06-24 DIAGNOSIS — J40 SINOBRONCHITIS: ICD-10-CM

## 2024-06-24 DIAGNOSIS — R05.1 ACUTE COUGH: Primary | ICD-10-CM

## 2024-06-24 RX ORDER — AMOXICILLIN AND CLAVULANATE POTASSIUM 875; 125 MG/1; MG/1
1 TABLET, FILM COATED ORAL 2 TIMES DAILY
Qty: 20 TABLET | Refills: 0 | Status: SHIPPED | OUTPATIENT
Start: 2024-06-24 | End: 2024-07-04

## 2024-06-24 RX ORDER — METHYLPREDNISOLONE 4 MG/1
TABLET ORAL
Qty: 1 EACH | Refills: 0 | Status: SHIPPED | OUTPATIENT
Start: 2024-06-24

## 2024-06-24 RX ORDER — CODEINE PHOSPHATE AND GUAIFENESIN 10; 100 MG/5ML; MG/5ML
5 SOLUTION ORAL EVERY 6 HOURS PRN
Qty: 120 ML | Refills: 0 | Status: SHIPPED | OUTPATIENT
Start: 2024-06-24

## 2024-06-24 NOTE — PROGRESS NOTES
CHIEF COMPLAINT:     Chief Complaint   Patient presents with    Upper Respiratory Infection     Last Saturday, was seen at . States is not feeling any better. Cough has got worse, HA, SOB at times. Coricidin HBP is not working.       HPI:   Tameka Latif is a 39 year old female .  The patient presents for a recheck after Urgent Care visit for diagnosis of URI.Was seen 6 days ago.   History from : 39-year-old presents to the urgent care with concerns for cough congestion and headache. Symptoms going on for 4 days. Given ongoing symptoms comes to the urgent care for evaluation differential diagnosis includes COVID flu viral URI strep. Testing is negative for strep flu and COVID. Do suspect viral URI discussed with patient will treat symptomatically.   The following tests were done: flu, covid and strept. Pt is on the following meds: albuterol and flonase and coricidin. The patient is not feeling better. Pt has increased congestion/PND. Cough is productive with clear to green mucus. Cough can be spastic at times, and the cough is causing some headaches.  Pt has had some SOB and occ wheezing. Pt does not feel the albuterol is helping the cough much. Pt did not return to work because she did not feel better. 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 Pt states the way she feels she could not wear a respirator right now.    Current Outpatient Medications   Medication Sig Dispense Refill    albuterol 108 (90 Base) MCG/ACT Inhalation Aero Soln Inhale 2 puffs into the lungs every 4 (four) hours as needed for Wheezing. 1 each 0    fluticasone propionate 50 MCG/ACT Nasal Suspension 2 sprays by Nasal route daily. 16 g 0    Dextromethorphan-guaiFENesin (CORICIDIN HBP CONGESTION/COUGH)  MG Oral Cap Take 1 capsule by mouth 3 (three) times daily as needed (cough and congestion). 168 capsule 0    amLODIPine 10 MG Oral Tab Take 1 tablet (10 mg total) by mouth daily. 90 tablet 1      Past Medical  History:    Anxiety    Depression    Essential hypertension      Social History:  Social History     Socioeconomic History    Marital status: Single   Tobacco Use    Smoking status: Never    Smokeless tobacco: Never   Vaping Use    Vaping status: Never Used   Substance and Sexual Activity    Alcohol use: Not Currently    Drug use: Never   Other Topics Concern    Caffeine Concern Yes     Comment: 1 cup of coffee daily    Exercise No        REVIEW OF SYSTEMS:   GENERAL: Denies fever, chills,weight change, decreased appetite  SKIN: Denies rashes, skin wounds or ulcers.  EYES: Denies blurred vision or double vision  HENT: see HPI  CHEST: Denies chest pain, or palpitations  LUNGS: see HPI  GI: Denies abdominal pain, N/V/C/D.   MUSCULOSKELETAL: no arthralgia or swollen joints  LYMPH:  Denies lymphadenopathy  NEURO:+headaches       EXAM:   /74 (BP Location: Right arm, Patient Position: Sitting, Cuff Size: adult)   Pulse 116   Temp 97.5 °F (36.4 °C) (Temporal)   Resp 20   Ht 5' 6\" (1.676 m)   Wt 227 lb 6.4 oz (103.1 kg)   LMP 06/06/2024 (Exact Date)   SpO2 97%   BMI 36.70 kg/m²   GENERAL: well developed, well nourished,in no apparent distress  SKIN: no rashes,no suspicious lesions  HEAD: atraumatic, normocephalic  EYES: conjunctiva clear, EOM intact, PERRLA  EARS: TM's dull, no bulging, no retraction, + fluid  NOSE: nostrils patent, cloudy exudates, nasal mucosa redden  THROAT: oral mucosa pink, moist. No visible dental caries. Posterior pharynx is + erythematous. PND,no exudates.  NECK: supple, non-tender  LUNGS: coarse breath sounds that clear with coughing to auscultation bilaterally, no wheezes or rhonchi. Breathing is non labored. Cough is spastic though  CARDIO: RRR without murmur  LYMPH:  mild cervical lymphadenopathy.      Physical Exam    ASSESSMENT AND PLAN:     Encounter Diagnoses   Name Primary?    Acute cough Yes    Sinobronchitis        No orders of the defined types were placed in this  encounter.      Meds & Refills for this Visit:  Requested Prescriptions     Signed Prescriptions Disp Refills    amoxicillin clavulanate 875-125 MG Oral Tab 20 tablet 0     Sig: Take 1 tablet by mouth 2 (two) times daily for 10 days.    methylPREDNISolone (MEDROL) 4 MG Oral Tablet Therapy Pack 1 each 0     Sig: As directed.    guaiFENesin-codeine (CHERATUSSIN AC) 100-10 MG/5ML Oral Solution 120 mL 0     Sig: Take 5 mL by mouth every 6 (six) hours as needed.       Imaging & Consults:  XR CHEST PA + LAT CHEST (CPT=71046)    PLAN:      -Cool Humidified air in room  - start Augmentin, Medrol dose sulma, take with food.  -Sleep with head elevated at nightime if coughing    -Push fluids, tea with honey and plenty of rest   -Limit dairy, to avoid increased congestion  -OTC cough medicine such as Robitussin DM  and cheratussin AC  -OTC Tylenol  -Soothing cough drops   -Flonase OTC 2 sprays each nostril daily  -an antihistamine like zyrtec/Allegra for post nasal drip.  -chest xray- await results.  - Note given to Pt for her job.  Patient to f/u if not improving  Patient verbalizes understanding of the treatment plan.

## 2024-06-25 ENCOUNTER — HOSPITAL ENCOUNTER (OUTPATIENT)
Dept: GENERAL RADIOLOGY | Age: 39
Discharge: HOME | End: 2024-06-25
Attending: FAMILY MEDICINE

## 2024-06-25 DIAGNOSIS — R05.1 ACUTE COUGH: ICD-10-CM

## 2024-06-25 PROCEDURE — 71046 X-RAY EXAM CHEST 2 VIEWS: CPT | Performed by: FAMILY MEDICINE

## 2025-04-23 PROBLEM — F33.1 MODERATE EPISODE OF RECURRENT MAJOR DEPRESSIVE DISORDER (HCC): Status: ACTIVE | Noted: 2025-04-23

## 2025-04-23 PROBLEM — F34.81 DISRUPTIVE MOOD DYSREGULATION DISORDER (HCC): Status: RESOLVED | Noted: 2023-05-08 | Resolved: 2025-04-23

## 2025-04-23 PROBLEM — R73.03 PREDIABETES: Status: ACTIVE | Noted: 2025-04-23

## 2025-06-11 ENCOUNTER — OFFICE VISIT (OUTPATIENT)
Dept: INTERNAL MEDICINE CLINIC | Facility: CLINIC | Age: 40
End: 2025-06-11
Payer: COMMERCIAL

## 2025-06-11 VITALS
HEART RATE: 86 BPM | BODY MASS INDEX: 37.12 KG/M2 | TEMPERATURE: 99 F | RESPIRATION RATE: 16 BRPM | SYSTOLIC BLOOD PRESSURE: 126 MMHG | DIASTOLIC BLOOD PRESSURE: 78 MMHG | HEIGHT: 66 IN | OXYGEN SATURATION: 100 % | WEIGHT: 231 LBS

## 2025-06-11 DIAGNOSIS — E78.2 MIXED HYPERLIPIDEMIA: ICD-10-CM

## 2025-06-11 DIAGNOSIS — Z11.3 SCREENING EXAMINATION FOR STD (SEXUALLY TRANSMITTED DISEASE): ICD-10-CM

## 2025-06-11 DIAGNOSIS — E66.812 CLASS 2 SEVERE OBESITY WITH SERIOUS COMORBIDITY AND BODY MASS INDEX (BMI) OF 39.0 TO 39.9 IN ADULT, UNSPECIFIED OBESITY TYPE (HCC): ICD-10-CM

## 2025-06-11 DIAGNOSIS — B37.9 CANDIDA INFECTION: ICD-10-CM

## 2025-06-11 DIAGNOSIS — F33.1 MODERATE EPISODE OF RECURRENT MAJOR DEPRESSIVE DISORDER (HCC): ICD-10-CM

## 2025-06-11 DIAGNOSIS — E66.01 CLASS 2 SEVERE OBESITY WITH SERIOUS COMORBIDITY AND BODY MASS INDEX (BMI) OF 39.0 TO 39.9 IN ADULT, UNSPECIFIED OBESITY TYPE (HCC): ICD-10-CM

## 2025-06-11 DIAGNOSIS — F41.1 GAD (GENERALIZED ANXIETY DISORDER): ICD-10-CM

## 2025-06-11 DIAGNOSIS — Z00.00 ROUTINE PHYSICAL EXAMINATION: Primary | ICD-10-CM

## 2025-06-11 DIAGNOSIS — R73.03 PREDIABETES: ICD-10-CM

## 2025-06-11 DIAGNOSIS — I10 PRIMARY HYPERTENSION: ICD-10-CM

## 2025-06-11 PROCEDURE — 99395 PREV VISIT EST AGE 18-39: CPT | Performed by: INTERNAL MEDICINE

## 2025-06-11 RX ORDER — SERTRALINE HYDROCHLORIDE 100 MG/1
100 TABLET, FILM COATED ORAL DAILY
COMMUNITY
Start: 2025-05-14

## 2025-06-11 RX ORDER — NYSTATIN 100000 [USP'U]/G
POWDER TOPICAL
Qty: 120 G | Refills: 0 | Status: SHIPPED | OUTPATIENT
Start: 2025-06-11

## 2025-06-11 RX ORDER — CLONAZEPAM 0.5 MG/1
0.5 TABLET ORAL DAILY PRN
COMMUNITY
Start: 2025-05-13

## 2025-06-11 NOTE — PATIENT INSTRUCTIONS
Continue to exercise at least 150 minutes a week and Eat a plant based diet     Please take 2000 IU of vitamin D daily for life to keep your bones strong    Please see your dentist every 6 months    Continue with regular eye exams  Continue follow-up with your therapist    Continue medications    See me back yearly for routine checkup or you can see me sooner for a Pap    I have sent a powder to see if that helps with the rash    I have ordered blood/urine test for you

## 2025-06-11 NOTE — PROGRESS NOTES
Patient Office Visit    ASSESSMENT AND PLAN:   1. Routine physical examination  Note: Continue to exercise at least 150 minutes a week and Eat a plant based diet. Please take 2000 IU of vitamin D daily for life to keep your bones strong. Please see your dentist every 6 months.  Continue with regular eye exams.  Patient declined a Pap today    2. Class 2 severe obesity with serious comorbidity and body mass index (BMI) of 39.0 to 39.9 in adult, unspecified obesity type (HCC)  Note: Continue with diet and lifestyle modifications    3. PIPER (generalized anxiety disorder)  Note: On sertraline and clonazepam per the therapist    4. Mixed hyperlipidemia  Note: Diet controlled    5. Moderate episode of recurrent major depressive disorder (HCC)  Note: Continue current regimen    6. Prediabetes  Note: Diet controlled    7. Primary hypertension  Note: Stable on amlodipine    8. Screening examination for STD (sexually transmitted disease)  Note: Per patient request  - T Pallidum Screening C[place] [E]; Future  - Hepatitis B Surface Antigen [E]; Future  - HCV Antibody [E]; Future  - HIV AG AB Combo [E]; Future  - Chlamydia/Gc Amplification [E]; Future    9. Candida infection  Note: Will start treatment below  - nystatin 226431 UNIT/GM External Powder; Apply to affected area up to 4 times a day  Dispense: 120 g; Refill: 0    Return to clinic yearly for routine checkup      Patient/Caregiver Education: Patient/Caregiver Education: There are no barriers to learning. Medical education done. Outcome: Patient verbalizes understanding. Patient is notified to call with any questions, complications, allergies, or worsening or changing symptoms.  Patient is to call with any side effects or complications from the treatments as a result of today.      Reviewed Past Medical History and   Problem List[1]    Orders Placed This Encounter   Procedures    T Pallidum Screening [place] [E]     Standing Status:   Future     Expected Date:   6/11/2025      Expiration Date:   6/11/2026     Release to patient:   Immediate    Hepatitis B Surface Antigen [E]     Standing Status:   Future     Expected Date:   6/11/2025     Expiration Date:   6/11/2026     Release to patient:   Immediate    HCV Antibody [E]     Standing Status:   Future     Expected Date:   6/11/2025     Expiration Date:   6/11/2026     Release to patient:   Immediate    HIV AG AB Combo [E]     Standing Status:   Future     Expected Date:   6/11/2025     Expiration Date:   6/11/2026     Consent obtained?:   Yes     Release to patient:   Immediate     Requested Prescriptions     Signed Prescriptions Disp Refills    nystatin 511911 UNIT/GM External Powder 120 g 0     Sig: Apply to affected area up to 4 times a day         Alyssa Solis DO  CC:  Chief Complaint   Patient presents with    Follow - Up     6 week         HPI:   Tameka Latif is a 40-year-old female who presents for a routine physical    Anxiety: Has overall improved but she had to go back to work.  She has increased the sertraline dose and that has helped.  She still following up with the therapist.  It has been difficult to go back to work.    Hypertension: Stable  Rash under the breast: She has noticed that she has a rash under the breast that is itchy.  She has tried over-the-counter creams but it has not helped.  She feels that she has been sweating a lot    Past Medical History[2]    Past Surgical History[3]    Social History:  Short Social Hx on File[4]  Family History:  Family History[5]  Allergies:  Allergies[6]  Current Meds:  Medications Ordered Prior to Encounter[7]      REVIEW OF SYSTEMS   Constitutional: no fatigue normal energy no weight changes   HENT: normal sinuses and no mouth issues   Eyes: . normal vision no eye pain   Respiratory: normal respirations no cough   Cardiovascular: no CP, or palpitations   Gastrointestinal: normal bowels and no abd pains   Genitourinary:  normal urination no hematuria, no frequency    Musculoskeletal: no pains in arms/legs, normal range of motion   Skin: no rashes or skin lesions that are new   Neurological:  no weakness, no numbness, normal gait   Hematological:  no bruises or bleeding   Psychiatric/Behavioral: normal mood no anxiety normal behavior     /78 (BP Location: Right arm, Patient Position: Sitting, Cuff Size: large)   Pulse 86   Temp 98.8 °F (37.1 °C) (Temporal)   Resp 16   Ht 5' 6\" (1.676 m)   Wt 231 lb (104.8 kg)   LMP 05/09/2025 (Exact Date)   SpO2 100%   BMI 37.28 kg/m²     PHYSICAL EXAM:   Constitutional: Vital signs reviewed as noted, well developed, in no acute distress.   HENT: NCAT, bilateral ear canal and tympanic membrane appear normal  Eyes: pupils reactive bilaterally  Neck: No thyroidmegaly  Cardiovascular: nl s1 s2 no m/r/g  Pulmonary/Chest: CTA bilaterally with no wheezes  Abdominal: Soft NT normal Bowel sounds  Extremities: no pedal edema   Neurological:  no weakness in UE and LE, reflexes are normal  Skin: Erythematous rash noted under the breast  Psychiatric:normal mood              [1]   Patient Active Problem List  Diagnosis    PIPER (generalized anxiety disorder)    Class 2 severe obesity with serious comorbidity and body mass index (BMI) of 39.0 to 39.9 in adult (HCC)    Primary hypertension    Mixed hyperlipidemia    Moderate episode of recurrent major depressive disorder (HCC)    Prediabetes   [2]   Past Medical History:   Anxiety    Depression    Essential hypertension   [3] No past surgical history on file.  [4]   Social History  Socioeconomic History    Marital status: Single   Tobacco Use    Smoking status: Never    Smokeless tobacco: Never   Vaping Use    Vaping status: Never Used   Substance and Sexual Activity    Alcohol use: Not Currently    Drug use: Never   Other Topics Concern    Caffeine Concern Yes     Comment: 1 cup of coffee daily    Exercise No     Social Drivers of Health     Food Insecurity: No Food Insecurity (4/23/2025)    NCSS  - Food Insecurity     Worried About Running Out of Food in the Last Year: No     Ran Out of Food in the Last Year: No   Transportation Needs: No Transportation Needs (4/23/2025)    NCSS - Transportation     Lack of Transportation: No   Housing Stability: Not At Risk (4/23/2025)    NCSS - Housing/Utilities     Has Housing: Yes     Worried About Losing Housing: No     Unable to Get Utilities: No   [5]   Family History  Problem Relation Age of Onset    Hypertension Mother     Hypertension Brother     Hypertension Maternal Grandmother     Diabetes Maternal Grandmother    [6] No Known Allergies  [7]   Current Outpatient Medications on File Prior to Visit   Medication Sig Dispense Refill    clonazePAM 0.5 MG Oral Tab Take 1 tablet (0.5 mg total) by mouth daily as needed for Anxiety.      sertraline 100 MG Oral Tab Take 1 tablet (100 mg total) by mouth daily.      amLODIPine 10 MG Oral Tab Take 1 tablet (10 mg total) by mouth daily. 90 tablet 1     No current facility-administered medications on file prior to visit.

## (undated) NOTE — LETTER
Date: 6/24/2024    Patient Name: Tameka Latif          To Whom it may concern:    This letter has been written at the patient's request. The above patient was seen at [facility] for treatment of a medical condition- Sinobronchitis.    This patient should be excused from attending work/school from 6/24/24 through 6/30/24.    The patient may return to work/school on 7/1/24 with the following limitations none.        Sincerely,          OBI Pizano

## (undated) NOTE — LETTER
Date & Time: 6/18/2024, 12:42 PM  Patient: Tameka Latif  Encounter Provider(s):    Graciela Welch MD       To Whom It May Concern:    Tameka Latif was seen and treated in our department on 6/18/2024. She should not return to work until 6/22/2024 .    If you have any questions or concerns, please do not hesitate to call.        _____________________________  Physician/AP[facility] Signature